# Patient Record
Sex: MALE | Race: WHITE | NOT HISPANIC OR LATINO | URBAN - METROPOLITAN AREA
[De-identification: names, ages, dates, MRNs, and addresses within clinical notes are randomized per-mention and may not be internally consistent; named-entity substitution may affect disease eponyms.]

---

## 2019-12-16 ENCOUNTER — APPOINTMENT (RX ONLY)
Dept: URBAN - METROPOLITAN AREA OTHER 9 | Facility: OTHER | Age: 39
Setting detail: DERMATOLOGY
End: 2019-12-16

## 2019-12-16 DIAGNOSIS — L65.9 NONSCARRING HAIR LOSS, UNSPECIFIED: ICD-10-CM

## 2019-12-16 DIAGNOSIS — Z87.891 PERSONAL HISTORY OF NICOTINE DEPENDENCE: ICD-10-CM

## 2019-12-16 PROBLEM — F41.9 ANXIETY DISORDER, UNSPECIFIED: Status: ACTIVE | Noted: 2019-12-16

## 2019-12-16 PROBLEM — K21.9 GASTRO-ESOPHAGEAL REFLUX DISEASE WITHOUT ESOPHAGITIS: Status: ACTIVE | Noted: 2019-12-16

## 2019-12-16 PROCEDURE — ? COUNSELING

## 2019-12-16 PROCEDURE — ? PATIENT SPECIFIC COUNSELING

## 2019-12-16 PROCEDURE — ? NOTED ON EXAM BUT NOT TREATED

## 2019-12-16 PROCEDURE — 99213 OFFICE O/P EST LOW 20 MIN: CPT

## 2019-12-16 NOTE — PROCEDURE: PATIENT SPECIFIC COUNSELING
The patient reports receiving PRP treatment to his scalp 2 years ago at an office out of town, a hair transplant surgery 10 years ago, and has discontinued taking Finasteride 10 years ago. He reports recently having bloodwork done, and Dr. Maciel requested the patient provide these to him so he may review the results.\\n\\Ani Maciel counseled the patient that he has a barely noticeable horizontal, linear scar from his previous hair transplant on his posterior scalp. He informed the patient he can utilize follicular unit extractions (FUE) along his previous hair transplant scar, which would require shaving all of the patient’s hair. He would then feather in about 1500 grafts on the patient’s bilateral temples and crown of the patient’s scalp. The patient refused the FUE, due to the necessity of shaving the scalp, and Dr. Maciel offered the patient follicular unit transplant (FUT) instead. He counseled the patient FUT would be priced at $7-$9 per graft, meaning the patient can expect to pay $10,500-$13,500 total for the procedure and the patient voiced interest in this option. \\n\\Ani Maciel instructed the patient to avoid Botox injections a few months both before and after the follicular unit transplant procedure. He informed the patient to wear a button-up shirt the day of the procedure instead of anything that has to be pulled over his head. He counseled the patient he should expect to be in office for 6-8 hours for the procedure, and during recovery he may need to wear a cap for about 7-10 days to cover any bruising and swelling. The patient verbalized understanding to all discussed. \\n\\nThe patient’s mother requested a consultation for hair grafting with Dr. Maciel at the same time of the patient’s FUT procedure, which Dr. Maciel confirmed was possible. The patient and his mother then were taken to our surgical scheduler, Alejandra, to schedule both the procedure and consultation.
Detail Level: Simple